# Patient Record
Sex: MALE | Race: WHITE
[De-identification: names, ages, dates, MRNs, and addresses within clinical notes are randomized per-mention and may not be internally consistent; named-entity substitution may affect disease eponyms.]

---

## 2017-12-07 NOTE — HP
PREOPERATIVE HISTORY AND PHYSICAL:

 

DATE OF ADMISSION/SURGERY:  12/11/17

 

DATE OF OFFICE VISIT:  12/07/17

 

ATTENDING SURGEON:  Dr. Christina Mcneal * (DICTATED BY RENATA MCDANIELS)

 

PROCEDURE:  Right knee arthroscopy, partial meniscectomy.

 

CHIEF COMPLAINT:  Right knee pain.

 

HISTORY OF PRESENT ILLNESS:  Cuong is a 52-year-old male, who presents to the 
clinic for right knee pain due to a work-related injury.  He had catching and 
locking.  He failed conservative measures and has therefore agreed to undergo a 
right knee arthroscopy, partial meniscectomy with Dr. Mcneal, on 12/11/17.

 

PAST MEDICAL HISTORY:  Lyme disease.

 

PAST SURGICAL HISTORY:  Right shoulder, right knee ACL, left knee meniscus 
surgery, left shoulder esophageal cyst removal, and left knee arthroscopy.

 

The patient denies prior complications to anesthesia.

 

MEDICATIONS:  No active medications.

 

ALLERGIES:  NSAIDs.

 

FAMILY HISTORY: Positive for heart disease and cancer, thyroid disease, 
diabetes. Denies family history of DVT or PE.

 

SOCIAL HISTORY:  He lives alone.  He is a  at [employer].  
He has a prior history of smoking for 10 to 15 years, one pack per day, he quit 
over 20 years ago.  He rarely drinks alcohol.  He denies illegal drug use.

 

REVIEW OF SYSTEMS:  A 14-point review of systems was reviewed with the patient. 
Positive for current complaint, otherwise negative.  He denies fevers or 
chills. Denies numbness or tingling.  Denies chest pain or shortness of breath.
  Denies history of DVT or PE.  Denies history of bleeding disorder.

 

                               PHYSICAL EXAMINATION

 

GENERAL:  A 52-year-old, well-developed, well-nourished male, in no acute 
distress. Alert and oriented x3.  Appropriate mood and affect.

 

VITAL SIGNS:  Height 68, weight 202, pulse 66, blood pressure 140/90, 
respiratory rate 16, temperature 96.3, BMI 30.7.

 

HEENT:  Normocephalic, atraumatic.  PERRLA.  Throat:  Clear.

 

NECK:  Supple.

 

PULMONARY:  Lungs are clear to auscultation bilaterally.  No wheezing, rhonchi, 
or rales.

 

CARDIO:  Regular rate and rhythm.  S1, S2.  No murmurs, gallops, or rubs.  No 
edema.

 

ABDOMEN:  Positive bowel sounds, soft, nontender.

 

MUSCULOSKELETAL:  Right lower extremity, skin is intact.  No warmth or 
erythema. Range of motion 0 to 130.  Pain with knee flexion.  Positive 
Ling.  Tender over the medial joint line.  Stable Lachman.  Stable with 
varus and valgus stress. Calves soft, nontender, +2 PT pulses.  Sensation 
intact to light touch distally.

 

NEUROLOGIC:  Alert and oriented x3.  Cranial nerves grossly intact.  Sensation 
is intact to light touch.

 

 DIAGNOSTIC STUDIES:  MRI reveals a medial meniscus tear and mild 
osteoarthritis and Baker cyst.

 

IMPRESSION:  Right knee medial meniscus tear.

 

PLAN: The patient is scheduled to undergo a right knee arthroscopy, partial 
meniscectomy with Dr. Mcneal, on 12/11/17.  He will follow up in 10 to 14 days 
postop for followup and suture removal.  Percocet will be sent to patient's 
pharmacy for postop pain management.

 

 ____________________________________ RENATA MCDANIELS

 

[id number]/[facility] #: [id number]

[facility]

## 2017-12-11 ENCOUNTER — HOSPITAL ENCOUNTER (OUTPATIENT)
Dept: HOSPITAL 25 - OREAST | Age: 52
Discharge: HOME | End: 2017-12-11
Attending: ORTHOPAEDIC SURGERY
Payer: COMMERCIAL

## 2017-12-11 VITALS — DIASTOLIC BLOOD PRESSURE: 87 MMHG | SYSTOLIC BLOOD PRESSURE: 126 MMHG
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
## 2017-12-11 DIAGNOSIS — M25.561: ICD-10-CM

## 2017-12-11 DIAGNOSIS — S83.241A: Primary | ICD-10-CM

## 2017-12-11 DIAGNOSIS — I10: ICD-10-CM

## 2017-12-11 DIAGNOSIS — Z87.891: ICD-10-CM

## 2017-12-11 DIAGNOSIS — S83.281A: ICD-10-CM
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
## 2017-12-11 DIAGNOSIS — M25.461: ICD-10-CM
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
## 2017-12-12 NOTE — OP
CC:  PCP, Jennifer May MD *

 

DATE OF OPERATION:  12/11/17 - [facility]

 

DATE OF BIRTH:  [birth year]

 

SURGEON:  Christina Mcneal MD.

 

ASSISTANT:  RENATA Avila.  An assistant was needed for the entirety of 
the case to help with positioning, retraction and utilized throughout all 
portions of case.

 

ANESTHESIOLOGIST:  Dr. Dorman.

 

ANESTHESIA:  General.

 

PRE-OP DIAGNOSIS:  Right knee medial meniscus tear.

 

POST-OP DIAGNOSIS:  Right knee medial and lateral meniscus tears with 
chondrosis of the medial femoral condyle and the trochlea, small area of 
chondrosis of the patella as well as plica.

 

OPERATIVE PROCEDURES:

1.  Right knee arthroscopy with partial medial and lateral meniscectomy, plica 
excision, and chondroplasty of the medial femoral condyle, trochlea, and 
patella.

2.  Injection of 80 mg of Depo-Medrol intraarticularly.

 

COMPLICATIONS:  None.

 

ESTIMATED BLOOD LOSS:  Minimal.

 

INDICATIONS:  Cuong Stout is a 52-year-old male who sustained a work-related 
injury to his right knee and had a meniscus tear.  He has failed conservative 
management and elected to proceed with partial meniscectomy.  Risks and 
benefits were discussed at length including but not limited to bleeding, 
infection, damage to nerves, vessels, surrounding structures, wound nonhealing, 
persistent pain, need for surgery, scarring, stiffness, incomplete relief of 
symptoms, risk of anesthesia, and risk of DVT.  He has elected to proceed.

 

DESCRIPTION OF PROCEDURE:  The patient was greeted in the preoperative area by 
the attending surgeon.  The correct extremity was marked and the consent was 
confirmed. The patient was then brought back to the operating suite where he 
was placed in the supine position on the operating table.  He then underwent 
general anesthesia with LMA intubation.  An unsterile tourniquet was placed 
high on the proximal thigh, lateral post was positioned.  The right knee was 
prepped and draped in the usual sterile fashion beginning with chlorhexidine 
soap, scrub, and alcohol wipe and a final prep with ChloraPrep.

 

After appropriate surgical pause indicating site, side, procedure, 
administration of antibiotics, the knee was intra-articularly injected with 1% 
lidocaine with epi. The lateral portal was made using an 11 blade.  Scope was 
introduced into the joint.  Joint was examined.  There were areas of grade 0 
changes to the patella and a small area superiorly of grade 2 changes with 
unstable flaps.  The trochlea proximally had minimal wear, but in the center he 
had areas of grade 3 changes with unstable flaps.  The medial and lateral 
gutters were intact without any loose bodies.  There is abundant synovitis and 
plica anteriorly, which was very inflamed. There was abundant synovitic tissue.
  The knee was placed at 90 degrees.  The anteromedial portal was made after 
localization with an 18-gauge needle.  The shaver was used to debride back the 
synovitis and plica anteriorly and medially. The electrocautery device was used 
to maintain hemostasis as the tissue was very friable.  There was a small area 
of grade 4 changes that was about 1 mm x 2 mm surrounded by a small dime-sized 
area of grade 2 and 3 changes along the medial femoral condyle that was 
separate from a second area that was in the weightbearing surface that was 
about 1.5 cm x 1.5 cm that had grade 2 changes with unstable flaps.  This was 
debrided back using a shaver.  The medial plateau had grade 1 changes.  The 
medial meniscus had unstable tearing posteriorly in the white-white zone.  This 
was debrided back using arthroscopic biters and brittani.  The meniscus quality 
was poor.  The ACL and PCL were intact. The knee was placed in a figure-of- 4 
position. The root of the lateral meniscus was intact as well as the posterior 
lateral aspect, but the periphery had a radial split tear in white-white zone. 
These were debrided back using a shaver and the biter.  Once the meniscectomy 
was complete, attention was directed to the chondrosis of the trochlea as well 
as medial femoral condyle. A small chondroplasty was done. The plica was then 
further released, so that the joint was fully visualized.  The joint was then 
thoroughly irrigated and lavaged once hemostasis was obtained, removed any 
loose debris.  Final images were obtained and the portals were then irrigated 
and closed with 3-0 nylon.  The knee was then intra-articularly injected with 
80 mg of Depo with 30 cc of 0.25% Marcaine.  Sterile dressings were applied.  
He was woken from anesthesia, a Cryo/Cuff was placed and he was brought to the 
PACU in good condition.

 

POSTOPERATIVE PLAN:  He will be weightbearing as tolerated, but on crutches for 
the first 3 to 5 days.  He will be discharged on pain medication.  I will see 
the patient back in 10 to 14 days.

 

 [id number]/[facility] #: [id number]

MTDD

## 2018-08-22 ENCOUNTER — HOSPITAL ENCOUNTER (OUTPATIENT)
Dept: HOSPITAL 25 - OR | Age: 53
Discharge: HOME | End: 2018-08-22
Attending: ORTHOPAEDIC SURGERY
Payer: COMMERCIAL

## 2018-08-22 VITALS — DIASTOLIC BLOOD PRESSURE: 97 MMHG | SYSTOLIC BLOOD PRESSURE: 166 MMHG
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
## 2018-08-22 DIAGNOSIS — S42.022A: Primary | ICD-10-CM
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
## 2018-08-22 PROCEDURE — C1776 JOINT DEVICE (IMPLANTABLE): HCPCS

## 2018-08-22 PROCEDURE — 71045 X-RAY EXAM CHEST 1 VIEW: CPT

## 2018-08-22 PROCEDURE — C1713 ANCHOR/SCREW BN/BN,TIS/BN: HCPCS

## 2018-08-22 RX ADMIN — FENTANYL CITRATE PRN MCG: 0.05 INJECTION, SOLUTION INTRAMUSCULAR; INTRAVENOUS at 20:00

## 2018-08-22 RX ADMIN — FENTANYL CITRATE PRN MCG: 0.05 INJECTION, SOLUTION INTRAMUSCULAR; INTRAVENOUS at 19:28

## 2018-08-22 RX ADMIN — FENTANYL CITRATE PRN MCG: 0.05 INJECTION, SOLUTION INTRAMUSCULAR; INTRAVENOUS at 19:53

## 2018-08-22 RX ADMIN — OXYCODONE HYDROCHLORIDE AND ACETAMINOPHEN PRN TAB: 5; 325 TABLET ORAL at 19:11

## 2018-08-22 RX ADMIN — FENTANYL CITRATE PRN MCG: 0.05 INJECTION, SOLUTION INTRAMUSCULAR; INTRAVENOUS at 19:09

## 2018-08-22 RX ADMIN — OXYCODONE HYDROCHLORIDE AND ACETAMINOPHEN PRN TAB: 5; 325 TABLET ORAL at 19:12
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
## 2018-08-23 NOTE — RAD
INDICATION:  Left clavicle operative reduction and internal fixation.



COMPARISON:  Comparison is made with a prior study from August 11, 2018.



TECHNIQUE: 11.5 seconds of intermittent fluoroscopic guidance were provided and 4 spot

films of the left clavicle were obtained in the operating room.



FINDINGS:  The films demonstrate operative reduction internal fixation of the previously

noted comminuted fracture of the mid clavicle. There is a surgical metallic plate present

along the superior aspect of the clavicle transfixed with multiple screws spanning the

fracture fragments.



IMPRESSION:  INTRAOPERATIVE CONTROL FILMS. 



CPT II Codes:

## 2018-08-23 NOTE — RAD
HISTORY: r/o pneumothorax



COMPARISONS: None



VIEWS: 1: frontal portable view of the chest at 6:54 PM 



FINDINGS:

LINES AND TUBES: None.

CARDIOMEDIASTINAL SILHOUETTE: The cardiomediastinal silhouette is normal for portable

technique.

PLEURA: The costophrenic angles are sharp. No pleural abnormalities are noted. There is no

appreciable pneumothorax.

LUNG PARENCHYMA: The lungs are clear.

ABDOMEN: The upper abdomen is clear. There is no subphrenic gas.

BONES AND SOFT TISSUES: The patient is status post left clavicle internal fixation. 



IMPRESSION: NO ACTIVE CARDIOPULMONARY DISEASE.

## 2019-03-28 PROBLEM — Z00.00 ENCOUNTER FOR PREVENTIVE HEALTH EXAMINATION: Status: ACTIVE | Noted: 2019-03-28

## 2019-04-01 ENCOUNTER — APPOINTMENT (OUTPATIENT)
Dept: HEART AND VASCULAR | Facility: CLINIC | Age: 54
End: 2019-04-01